# Patient Record
Sex: FEMALE | Race: ASIAN | Employment: FULL TIME | URBAN - METROPOLITAN AREA
[De-identification: names, ages, dates, MRNs, and addresses within clinical notes are randomized per-mention and may not be internally consistent; named-entity substitution may affect disease eponyms.]

---

## 2017-09-28 LAB
ABO + RH BLD: NORMAL
ABO + RH BLD: NORMAL
HBV SURFACE AG SERPL QL IA: NORMAL
HIV 1+2 AB+HIV1 P24 AG SERPL QL IA: NORMAL
RUBELLA ANTIBODY IGG QUANTITATIVE: NORMAL IU/ML
T PALLIDUM IGG SER QL: NORMAL

## 2018-04-02 LAB — GROUP B STREP PCR: NORMAL

## 2018-05-01 ENCOUNTER — ANESTHESIA (OUTPATIENT)
Dept: OBGYN | Facility: CLINIC | Age: 28
End: 2018-05-01
Payer: COMMERCIAL

## 2018-05-01 ENCOUNTER — ANESTHESIA EVENT (OUTPATIENT)
Dept: OBGYN | Facility: CLINIC | Age: 28
End: 2018-05-01
Payer: COMMERCIAL

## 2018-05-01 ENCOUNTER — HOSPITAL ENCOUNTER (INPATIENT)
Facility: CLINIC | Age: 28
LOS: 2 days | Discharge: HOME | End: 2018-05-03
Attending: OBSTETRICS & GYNECOLOGY | Admitting: OBSTETRICS & GYNECOLOGY
Payer: COMMERCIAL

## 2018-05-01 LAB
ABO + RH BLD: NORMAL
ABO + RH BLD: NORMAL
RUPTURE OF FETAL MEMBRANES BY ROM PLUS: POSITIVE
SPECIMEN EXP DATE BLD: NORMAL
T PALLIDUM IGG+IGM SER QL: NEGATIVE

## 2018-05-01 PROCEDURE — 00HU33Z INSERTION OF INFUSION DEVICE INTO SPINAL CANAL, PERCUTANEOUS APPROACH: ICD-10-PCS | Performed by: ANESTHESIOLOGY

## 2018-05-01 PROCEDURE — 72200001 ZZH LABOR CARE VAGINAL DELIVERY SINGLE

## 2018-05-01 PROCEDURE — 25000125 ZZHC RX 250: Performed by: ANESTHESIOLOGY

## 2018-05-01 PROCEDURE — 86780 TREPONEMA PALLIDUM: CPT | Performed by: OBSTETRICS & GYNECOLOGY

## 2018-05-01 PROCEDURE — 27110038 ZZH RX 271: Performed by: ANESTHESIOLOGY

## 2018-05-01 PROCEDURE — 86901 BLOOD TYPING SEROLOGIC RH(D): CPT | Performed by: OBSTETRICS & GYNECOLOGY

## 2018-05-01 PROCEDURE — G0463 HOSPITAL OUTPT CLINIC VISIT: HCPCS

## 2018-05-01 PROCEDURE — 25000128 H RX IP 250 OP 636: Performed by: OBSTETRICS & GYNECOLOGY

## 2018-05-01 PROCEDURE — 84112 EVAL AMNIOTIC FLUID PROTEIN: CPT | Performed by: OBSTETRICS & GYNECOLOGY

## 2018-05-01 PROCEDURE — 37000011 ZZH ANESTHESIA WARD SERVICE

## 2018-05-01 PROCEDURE — 3E0R3BZ INTRODUCTION OF ANESTHETIC AGENT INTO SPINAL CANAL, PERCUTANEOUS APPROACH: ICD-10-PCS | Performed by: ANESTHESIOLOGY

## 2018-05-01 PROCEDURE — 12000037 ZZH R&B POSTPARTUM INTERMEDIATE

## 2018-05-01 PROCEDURE — 59025 FETAL NON-STRESS TEST: CPT

## 2018-05-01 PROCEDURE — 25000128 H RX IP 250 OP 636: Performed by: ANESTHESIOLOGY

## 2018-05-01 PROCEDURE — 0KQM0ZZ REPAIR PERINEUM MUSCLE, OPEN APPROACH: ICD-10-PCS | Performed by: OBSTETRICS & GYNECOLOGY

## 2018-05-01 PROCEDURE — 25000125 ZZHC RX 250: Performed by: OBSTETRICS & GYNECOLOGY

## 2018-05-01 PROCEDURE — 86900 BLOOD TYPING SEROLOGIC ABO: CPT | Performed by: OBSTETRICS & GYNECOLOGY

## 2018-05-01 PROCEDURE — 25000132 ZZH RX MED GY IP 250 OP 250 PS 637: Performed by: OBSTETRICS & GYNECOLOGY

## 2018-05-01 PROCEDURE — 36415 COLL VENOUS BLD VENIPUNCTURE: CPT | Performed by: OBSTETRICS & GYNECOLOGY

## 2018-05-01 PROCEDURE — 10907ZC DRAINAGE OF AMNIOTIC FLUID, THERAPEUTIC FROM PRODUCTS OF CONCEPTION, VIA NATURAL OR ARTIFICIAL OPENING: ICD-10-PCS | Performed by: OBSTETRICS & GYNECOLOGY

## 2018-05-01 RX ORDER — NALBUPHINE HYDROCHLORIDE 10 MG/ML
2.5-5 INJECTION, SOLUTION INTRAMUSCULAR; INTRAVENOUS; SUBCUTANEOUS EVERY 6 HOURS PRN
Status: DISCONTINUED | OUTPATIENT
Start: 2018-05-01 | End: 2018-05-01

## 2018-05-01 RX ORDER — HYDROMORPHONE HYDROCHLORIDE 1 MG/ML
0.3 INJECTION, SOLUTION INTRAMUSCULAR; INTRAVENOUS; SUBCUTANEOUS
Status: DISCONTINUED | OUTPATIENT
Start: 2018-05-01 | End: 2018-05-03 | Stop reason: HOSPADM

## 2018-05-01 RX ORDER — OXYTOCIN/0.9 % SODIUM CHLORIDE 30/500 ML
340 PLASTIC BAG, INJECTION (ML) INTRAVENOUS CONTINUOUS PRN
Status: DISCONTINUED | OUTPATIENT
Start: 2018-05-01 | End: 2018-05-03 | Stop reason: HOSPADM

## 2018-05-01 RX ORDER — ONDANSETRON 2 MG/ML
4 INJECTION INTRAMUSCULAR; INTRAVENOUS EVERY 6 HOURS PRN
Status: DISCONTINUED | OUTPATIENT
Start: 2018-05-01 | End: 2018-05-01

## 2018-05-01 RX ORDER — NALOXONE HYDROCHLORIDE 0.4 MG/ML
.1-.4 INJECTION, SOLUTION INTRAMUSCULAR; INTRAVENOUS; SUBCUTANEOUS
Status: DISCONTINUED | OUTPATIENT
Start: 2018-05-01 | End: 2018-05-03 | Stop reason: HOSPADM

## 2018-05-01 RX ORDER — ONDANSETRON 4 MG/1
4 TABLET, ORALLY DISINTEGRATING ORAL EVERY 6 HOURS PRN
Status: DISCONTINUED | OUTPATIENT
Start: 2018-05-01 | End: 2018-05-01

## 2018-05-01 RX ORDER — METHYLERGONOVINE MALEATE 0.2 MG/ML
200 INJECTION INTRAVENOUS
Status: DISCONTINUED | OUTPATIENT
Start: 2018-05-01 | End: 2018-05-01

## 2018-05-01 RX ORDER — EPHEDRINE SULFATE 50 MG/ML
5 INJECTION, SOLUTION INTRAMUSCULAR; INTRAVENOUS; SUBCUTANEOUS
Status: DISCONTINUED | OUTPATIENT
Start: 2018-05-01 | End: 2018-05-01

## 2018-05-01 RX ORDER — MISOPROSTOL 200 UG/1
400 TABLET ORAL
Status: DISCONTINUED | OUTPATIENT
Start: 2018-05-01 | End: 2018-05-03 | Stop reason: HOSPADM

## 2018-05-01 RX ORDER — ROPIVACAINE HYDROCHLORIDE 2 MG/ML
10 INJECTION, SOLUTION EPIDURAL; INFILTRATION; PERINEURAL ONCE
Status: COMPLETED | OUTPATIENT
Start: 2018-05-01 | End: 2018-05-01

## 2018-05-01 RX ORDER — FENTANYL CITRATE 50 UG/ML
50-100 INJECTION, SOLUTION INTRAMUSCULAR; INTRAVENOUS
Status: DISCONTINUED | OUTPATIENT
Start: 2018-05-01 | End: 2018-05-01

## 2018-05-01 RX ORDER — NALOXONE HYDROCHLORIDE 0.4 MG/ML
.1-.4 INJECTION, SOLUTION INTRAMUSCULAR; INTRAVENOUS; SUBCUTANEOUS
Status: DISCONTINUED | OUTPATIENT
Start: 2018-05-01 | End: 2018-05-01

## 2018-05-01 RX ORDER — ACETAMINOPHEN 325 MG/1
650 TABLET ORAL EVERY 4 HOURS PRN
Status: DISCONTINUED | OUTPATIENT
Start: 2018-05-01 | End: 2018-05-01

## 2018-05-01 RX ORDER — OXYTOCIN/0.9 % SODIUM CHLORIDE 30/500 ML
100 PLASTIC BAG, INJECTION (ML) INTRAVENOUS CONTINUOUS
Status: DISCONTINUED | OUTPATIENT
Start: 2018-05-01 | End: 2018-05-03 | Stop reason: HOSPADM

## 2018-05-01 RX ORDER — LIDOCAINE HYDROCHLORIDE AND EPINEPHRINE 15; 5 MG/ML; UG/ML
INJECTION, SOLUTION EPIDURAL PRN
Status: DISCONTINUED | OUTPATIENT
Start: 2018-05-01 | End: 2018-05-02 | Stop reason: HOSPADM

## 2018-05-01 RX ORDER — SODIUM CHLORIDE, SODIUM LACTATE, POTASSIUM CHLORIDE, CALCIUM CHLORIDE 600; 310; 30; 20 MG/100ML; MG/100ML; MG/100ML; MG/100ML
INJECTION, SOLUTION INTRAVENOUS CONTINUOUS
Status: DISCONTINUED | OUTPATIENT
Start: 2018-05-01 | End: 2018-05-01

## 2018-05-01 RX ORDER — OXYCODONE HYDROCHLORIDE 5 MG/1
5 TABLET ORAL EVERY 4 HOURS PRN
Status: DISCONTINUED | OUTPATIENT
Start: 2018-05-01 | End: 2018-05-03 | Stop reason: HOSPADM

## 2018-05-01 RX ORDER — OXYTOCIN/0.9 % SODIUM CHLORIDE 30/500 ML
100-340 PLASTIC BAG, INJECTION (ML) INTRAVENOUS CONTINUOUS PRN
Status: DISCONTINUED | OUTPATIENT
Start: 2018-05-01 | End: 2018-05-01

## 2018-05-01 RX ORDER — CARBOPROST TROMETHAMINE 250 UG/ML
250 INJECTION, SOLUTION INTRAMUSCULAR
Status: DISCONTINUED | OUTPATIENT
Start: 2018-05-01 | End: 2018-05-01

## 2018-05-01 RX ORDER — ACETAMINOPHEN 325 MG/1
650 TABLET ORAL EVERY 4 HOURS PRN
Status: DISCONTINUED | OUTPATIENT
Start: 2018-05-01 | End: 2018-05-03 | Stop reason: HOSPADM

## 2018-05-01 RX ORDER — BUPIVACAINE HYDROCHLORIDE 2.5 MG/ML
INJECTION, SOLUTION EPIDURAL; INFILTRATION; INTRACAUDAL
Status: DISCONTINUED
Start: 2018-05-01 | End: 2018-05-01 | Stop reason: HOSPADM

## 2018-05-01 RX ORDER — FENTANYL CITRATE 50 UG/ML
100 INJECTION, SOLUTION INTRAMUSCULAR; INTRAVENOUS ONCE
Status: COMPLETED | OUTPATIENT
Start: 2018-05-01 | End: 2018-05-01

## 2018-05-01 RX ORDER — LIDOCAINE 40 MG/G
CREAM TOPICAL
Status: DISCONTINUED | OUTPATIENT
Start: 2018-05-01 | End: 2018-05-01

## 2018-05-01 RX ORDER — PRENATAL VIT/IRON FUM/FOLIC AC 27MG-0.8MG
1 TABLET ORAL DAILY
COMMUNITY

## 2018-05-01 RX ORDER — LANOLIN 100 %
OINTMENT (GRAM) TOPICAL
Status: DISCONTINUED | OUTPATIENT
Start: 2018-05-01 | End: 2018-05-03 | Stop reason: HOSPADM

## 2018-05-01 RX ORDER — OXYTOCIN 10 [USP'U]/ML
10 INJECTION, SOLUTION INTRAMUSCULAR; INTRAVENOUS
Status: DISCONTINUED | OUTPATIENT
Start: 2018-05-01 | End: 2018-05-01

## 2018-05-01 RX ORDER — AMOXICILLIN 250 MG
1 CAPSULE ORAL 2 TIMES DAILY
Status: DISCONTINUED | OUTPATIENT
Start: 2018-05-01 | End: 2018-05-03 | Stop reason: HOSPADM

## 2018-05-01 RX ORDER — OXYTOCIN/0.9 % SODIUM CHLORIDE 30/500 ML
1-24 PLASTIC BAG, INJECTION (ML) INTRAVENOUS CONTINUOUS
Status: DISCONTINUED | OUTPATIENT
Start: 2018-05-01 | End: 2018-05-01

## 2018-05-01 RX ORDER — OXYTOCIN 10 [USP'U]/ML
10 INJECTION, SOLUTION INTRAMUSCULAR; INTRAVENOUS
Status: DISCONTINUED | OUTPATIENT
Start: 2018-05-01 | End: 2018-05-03 | Stop reason: HOSPADM

## 2018-05-01 RX ORDER — HYDROCORTISONE 2.5 %
CREAM (GRAM) TOPICAL 3 TIMES DAILY PRN
Status: DISCONTINUED | OUTPATIENT
Start: 2018-05-01 | End: 2018-05-03 | Stop reason: HOSPADM

## 2018-05-01 RX ORDER — AMOXICILLIN 250 MG
2 CAPSULE ORAL 2 TIMES DAILY
Status: DISCONTINUED | OUTPATIENT
Start: 2018-05-01 | End: 2018-05-03 | Stop reason: HOSPADM

## 2018-05-01 RX ORDER — IBUPROFEN 600 MG/1
600 TABLET, FILM COATED ORAL EVERY 6 HOURS PRN
Status: DISCONTINUED | OUTPATIENT
Start: 2018-05-01 | End: 2018-05-03 | Stop reason: HOSPADM

## 2018-05-01 RX ORDER — BISACODYL 10 MG
10 SUPPOSITORY, RECTAL RECTAL DAILY PRN
Status: DISCONTINUED | OUTPATIENT
Start: 2018-05-03 | End: 2018-05-03 | Stop reason: HOSPADM

## 2018-05-01 RX ORDER — OXYCODONE AND ACETAMINOPHEN 5; 325 MG/1; MG/1
1 TABLET ORAL
Status: DISCONTINUED | OUTPATIENT
Start: 2018-05-01 | End: 2018-05-01

## 2018-05-01 RX ORDER — IBUPROFEN 400 MG/1
800 TABLET, FILM COATED ORAL
Status: DISCONTINUED | OUTPATIENT
Start: 2018-05-01 | End: 2018-05-01

## 2018-05-01 RX ADMIN — Medication 12 ML/HR: at 11:33

## 2018-05-01 RX ADMIN — ROPIVACAINE HYDROCHLORIDE 10 ML: 2 INJECTION, SOLUTION EPIDURAL; INFILTRATION at 12:01

## 2018-05-01 RX ADMIN — FENTANYL CITRATE 100 MCG: 50 INJECTION, SOLUTION INTRAMUSCULAR; INTRAVENOUS at 14:56

## 2018-05-01 RX ADMIN — Medication 2 MILLI-UNITS/MIN: at 12:15

## 2018-05-01 RX ADMIN — SODIUM CHLORIDE, POTASSIUM CHLORIDE, SODIUM LACTATE AND CALCIUM CHLORIDE 1000 ML: 600; 310; 30; 20 INJECTION, SOLUTION INTRAVENOUS at 10:25

## 2018-05-01 RX ADMIN — LIDOCAINE HYDROCHLORIDE AND EPINEPHRINE 3 ML: 15; 5 INJECTION, SOLUTION EPIDURAL at 11:34

## 2018-05-01 RX ADMIN — Medication 340 ML/HR: at 18:45

## 2018-05-01 RX ADMIN — LIDOCAINE HYDROCHLORIDE AND EPINEPHRINE 5 ML: 15; 5 INJECTION, SOLUTION EPIDURAL at 15:52

## 2018-05-01 RX ADMIN — ACETAMINOPHEN 650 MG: 325 TABLET ORAL at 20:27

## 2018-05-01 RX ADMIN — IBUPROFEN 600 MG: 600 TABLET ORAL at 20:27

## 2018-05-01 RX ADMIN — SODIUM CHLORIDE, POTASSIUM CHLORIDE, SODIUM LACTATE AND CALCIUM CHLORIDE: 600; 310; 30; 20 INJECTION, SOLUTION INTRAVENOUS at 11:36

## 2018-05-01 RX ADMIN — Medication 8 ML: at 14:51

## 2018-05-01 RX ADMIN — Medication 100 ML/HR: at 19:11

## 2018-05-01 NOTE — H&P
The patient is a 28 yo  with POPYP 18 who is admitted in labor SROM at 0400 hrs today.    Her prenatal course was followed at [facility] since 18 at 10 1/7 wks.     Prenatal labs show blood type A positive, Cynthia screen neg, Rubella immune, Hep B , Hep C, VDRL and HIV all negative.  GC/chlamydia negative.TSH 1.65, vit D 24.2, plts 290 K.      Ultrasound 10/9/17 11 6/7 wk Normal NT.    INNATAL negative, female infant  Flu shot 17    Ultrasound 17 19 5/7 wk, AGA, anterior placenta, nl ERIC, cx 5.5 cm, female    Glucose screen 18 184, 3 hr GTT normal on 18.    GB S negative on 18    Last prenatal visit on 18 at 40 2/7 wk,, cx 3/30/-2.    She had contractions, came to the MAC around 3 AM, had some leaking of fluid, positive amnisure around 0400.  Not leaking much since.    Contractions getting stronger.    Category 1 tracing    Exam: Cx 3-/vtx -2 Bulging forebag.  Forebag ruptured large amount of clear fluid, more than typical, probable some degree of polyhydramnios.  EFW 7.5 to 8 #    IUP 40 3/7 wk  SROM  Early labor  GBS negative  Now loss of large amount of clear fluid    Plan  Wants epidural eventually  Discussed possible pitocin of her labor does not improve with rupture of forebag.

## 2018-05-01 NOTE — PLAN OF CARE
1521- Dr. Herndon, [facility] @bedside to evaluate epidural. Epidural catheter pulled back and then rebolused @0469.

## 2018-05-01 NOTE — PLAN OF CARE
Data: Patient presented to [facility] at 0315.   Reason for maternal/fetal assessment per patient is Rule Out Labor  .  Patient is a . Prenatal record reviewed.      Obstetric History       T0      L0     SAB0   TAB0   Ectopic0   Multiple0   Live Births0       # Outcome Date GA Lbr Andrea/2nd Weight Sex Delivery Anes PTL Lv   1 Current               . Medical history: History reviewed. No pertinent past medical history.. Gestational Age 40w3d. VSS. Fetal movement present. Patient denies  backache, vaginal discharge, pelvic pressure, UTI symptoms, GI problems, bloody show, vaginal bleeding, edema, headache, visual disturbances, epigastric or URQ pain, rupture of membranes. Support persons present. Denies problem with pregnancy,  Notice fluid on chux before SVE. ROM test sent. Results positive.  Action: Verbal consent for EFM. Triage assessment completed. EFM applied for fetal assessment in labor . Uterine assessment done. Fetal assessment: Presumed adequate fetal oxygenation documented (see flow record).   Response: Dr. BREANN Webb informed of arrival, contractions every 3-5 minutes, reactive FHR tracing , cervix 3-4 cms, 80%, -2 station, Positive ROM test. But still feel bulging bag.. Plan per provider is admit for labor and see if labor progresses. encourage patient to ambulate. Patient verbalized agreement with plan. Patient transferred to room 214 ambulatory, oriented to room and call light. Report given to Yue Nayak RN.    I

## 2018-05-01 NOTE — PLAN OF CARE
1451- Dr. Mazariegos, HELENE @bedside to rebolus epidural. Bupivicaine and fentanyl pulled from bContexts by ANDREINA Salas RN and handed off to HELENE to administer, document, and dispose of/waste. Narcotic Time-Out Form completed.

## 2018-05-01 NOTE — ANESTHESIA PREPROCEDURE EVALUATION
Anesthesia Evaluation     .             ROS/MED HX    ENT/Pulmonary:  - neg pulmonary ROS     Neurologic:       Cardiovascular:  - neg cardiovascular ROS       METS/Exercise Tolerance:     Hematologic:         Musculoskeletal:         GI/Hepatic:         Renal/Genitourinary:         Endo:         Psychiatric:         Infectious Disease:         Malignancy:         Other:                     Physical Exam      Airway   Mallampati: II  TM distance: > 3 FB  Neck ROM: full  Mouth opening: > 3 cm    Dental     Cardiovascular       Pulmonary           neg OB ROS                 Anesthesia Plan      History & Physical Review      ASA Status:  .  OB Epidural Asa: 2            Postoperative Care      Consents  Anesthetic plan, risks, benefits and alternatives discussed with:  Patient..                          .

## 2018-05-01 NOTE — PLAN OF CARE
1114- Dr. Mazariegos, HELENE @bedside to place epidural. Ropivicaine pulled from Proximexs by ANDREINA Salas RN and handed off to HELENE to administer, document, and dispose of/waste.

## 2018-05-01 NOTE — PLAN OF CARE
Patient admitted from triage for labor.  Oriented to room and unit.  Care plan reviewed.  Questions answered.

## 2018-05-01 NOTE — IP AVS SNAPSHOT
MRN:[id number]                      After Visit Summary   5/1/2018    Kimberlyn Carlson    MRN: [id number]           Thank you!     Thank you for choosing [facility] for your care. Our goal is always to provide you with excellent care. Hearing back from our patients is one way we can continue to improve our services. Please take a few minutes to complete the written survey that you may receive in the mail after you visit with us. Thank you!        Patient Information     Date Of Birth          1990        About your hospital stay     You were admitted on:  May 1, 2018 You last received care in the:  [address]    You were discharged on:  May 3, 2018        Reason for your hospital stay       Maternity care                  Who to Call     For medical emergencies, please call 911.  For non-urgent questions about your medical care, please call your primary care provider or clinic, [phone]          Attending Provider     Provider Specialty    Haleigh Webb MD OB/Gyn       Primary Care Provider Office Phone # Fax #    Haleigh Webb [phone]      After Care Instructions     Activity       Review discharge instructions            Diet       Resume previous diet            Discharge Instructions - Postpartum visit       Schedule postpartum visit with your provider and return to clinic in 6 weeks.                  Additional Services     LACTATION REFERRAL       Your provider has referred you to: [facility] [phone]   [email or web address]    Please be aware that coverage of these services is subject to the terms and limitations of your health insurance plan.  Call member services at your health plan with any benefit or coverage questions.      Please bring the following with you to your appointment:    (1) Any X-Rays, CTs or MRIs which have been performed.   Contact the facility where they were done to arrange for  prior to your scheduled appointment.    (2) List of current medications  (3) This referral request   (4) Any documents/labs given to you for this referral                  Further instructions from your care team       Postpartum Vaginal Delivery Instructions    Activity       Ask family and friends for help when you need it.    Do not place anything in your vagina for 6 weeks.    You are not restricted on other activities, but take it easy for a few weeks to allow your body to recover from delivery.  You are able to do any activities you feel up to that point.    No driving until you have stopped taking your pain medications (usually two weeks after delivery).     Call your health care provider if you have any of these symptoms:       Increased pain, swelling, redness, or fluid around your stiches from an episiotomy or perineal tear.    A fever above 100.4 F (38 C) with or without chills when placing a thermometer under your tongue.    You soak a sanitary pad with blood within 1 hour, or you see blood clots larger than a golf ball.    Bleeding that lasts more than 6 weeks.    Vaginal discharge that smells bad.    Severe pain, cramping or tenderness in your lower belly area.    A need to urinate more frequently (use the toilet more often), more urgently (use the toilet very quickly), or it burns when you urinate.    Nausea and vomiting.    Redness, swelling or pain around a vein in your leg.    Problems breastfeeding or a red or painful area on your breast.    Chest pain and cough or are gasping for air.    Problems coping with sadness, anxiety, or depression.  If you have any concerns about hurting yourself or the baby, call your provider immediately.     You have questions or concerns after you return home.     Keep your hands clean:  Always wash your hands before touching your perineal area and stitches.  This helps reduce your risk of infection.   "If your hands aren't dirty, you may use an alcohol hand-rub to clean your hands. Keep your nails clean and short.        Pending Results     No orders found from 2018 to 2018.            Statement of Approval     Ordered          18 0829  I have reviewed and agree with all the recommendations and orders detailed in this document.  EFFECTIVE NOW     Approved and electronically signed by:  Duarte Peterson MD             Admission Information     Date & Time Provider Department Dept. Phone    2018 Haleigh Webb MD [address] [phone]      Your Vitals Were     Blood Pressure Pulse Temperature Respirations Height Weight    107/64 62 98.3  F (36.8  C) (Oral) 16 1.626 m (5' 4\") 77.6 kg (171 lb)    Pulse Oximetry BMI (Body Mass Index)                100% 29.35 kg/m2          MyChart Information     [employer] lets you send messages to your doctor, view your test results, renew your prescriptions, schedule appointments and more. To sign up, go to [email or web address] . Click on \"Log in\" on the left side of the screen, which will take you to the Welcome page. Then click on \"Sign up Now\" on the right side of the page.     You will be asked to enter the access code listed below, as well as some personal information. Please follow the directions to create your username and password.     Your access code is: [email or web address]  Expires: 2018 11:51 AM     Your access code will  in 90 days. If you need help or a new code, please call your [place] clinic or [phone].        Care EveryWhere ID     This is your Care EveryWhere ID. This could be used by other organizations to access your [place] medical records  [id number]        Equal Access to Services     [facility] ANGELICA : Javier Brown, dinorah ellsworth, qalucia cabrera. So [facility] [phone].    ATENCIÓN: Si habla español, tiene a kiser disposición " servicios gratuitos de asistencia lingüística. Nicolás mad[place] [phone].    We comply with applicable federal civil rights laws and [place] laws. We do not discriminate on the basis of race, color, national origin, age, disability, sex, sexual orientation, or gender identity.               Review of your medicines      CONTINUE these medicines which have NOT CHANGED        Dose / Directions    prenatal multivitamin plus iron 27-0.8 MG Tabs per tablet        Dose:  1 tablet   Take 1 tablet by mouth daily   Refills:  0         STOP taking     ranitidine 150 MG tablet   Commonly known as:  ZANTAC                    Protect others around you: Learn how to safely use, store and throw away your medicines at www.disposemymeds.org.             Medication List: This is a list of all your medications and when to take them. Check marks below indicate your daily home schedule. Keep this list as a reference.      Medications           Morning Afternoon Evening Bedtime As Needed    prenatal multivitamin plus iron 27-0.8 MG Tabs per tablet   Take 1 tablet by mouth daily

## 2018-05-01 NOTE — PLAN OF CARE
Patient presented to triage stating she had regular contractions at 0100 with them becoming stronger by 0200, was seen I office on Monday  Exam then was 3/50%/-2. monitors applied with consent , questions asked .No c/o bleeding or her BOW breaking.

## 2018-05-01 NOTE — IP AVS SNAPSHOT
[address]    [place] [address]    Phone:  [phone]                                       After Visit Summary   5/1/2018    Kimberlyn Carlson    MRN: [id number]           After Visit Summary Signature Page     I have received my discharge instructions, and my questions have been answered. I have discussed any challenges I see with this plan with the nurse or doctor.    ..........................................................................................................................................  Patient/Patient Representative Signature      ..........................................................................................................................................  Patient Representative Print Name and Relationship to Patient    ..................................................               ................................................  Date                                            Time    ..........................................................................................................................................  Reviewed by Signature/Title    ...................................................              ..............................................  Date                                                            Time

## 2018-05-02 PROCEDURE — 12000037 ZZH R&B POSTPARTUM INTERMEDIATE

## 2018-05-02 PROCEDURE — 25000132 ZZH RX MED GY IP 250 OP 250 PS 637: Performed by: OBSTETRICS & GYNECOLOGY

## 2018-05-02 RX ADMIN — ACETAMINOPHEN 650 MG: 325 TABLET ORAL at 20:12

## 2018-05-02 RX ADMIN — SENNOSIDES AND DOCUSATE SODIUM 2 TABLET: 8.6; 5 TABLET ORAL at 07:59

## 2018-05-02 RX ADMIN — ACETAMINOPHEN 650 MG: 325 TABLET ORAL at 07:59

## 2018-05-02 RX ADMIN — IBUPROFEN 600 MG: 600 TABLET ORAL at 02:28

## 2018-05-02 RX ADMIN — SENNOSIDES AND DOCUSATE SODIUM 1 TABLET: 8.6; 5 TABLET ORAL at 20:09

## 2018-05-02 RX ADMIN — IBUPROFEN 600 MG: 600 TABLET ORAL at 08:00

## 2018-05-02 RX ADMIN — ACETAMINOPHEN 650 MG: 325 TABLET ORAL at 15:22

## 2018-05-02 RX ADMIN — ACETAMINOPHEN 650 MG: 325 TABLET ORAL at 02:28

## 2018-05-02 NOTE — PROGRESS NOTES
"PPD #1    Doing well. Pain controlled. Breastfeeding without difficulty.    /69  Pulse 67  Temp 97.4  F (36.3  C) (Oral)  Resp 16  Ht 1.626 m (5' 4\")  Wt 77.6 kg (171 lb)  SpO2 100%  Breastfeeding? Unknown  BMI 29.35 kg/m2  Gen NAD  Abd soft nontender. Fundus firm.  Ext no c/c/e    A/P: PPD #1 after uncomplicated   Routine postpartum care  Anticipate discharge home tomorrow.    Jennifer L. Schwab MD  "

## 2018-05-02 NOTE — PLAN OF CARE
Pt. admitted from L&D  via wheelchair and transferred to bed with Assist x1. Pt. arrived with baby and was accompanied by significant other Kaushik and arrived with personal belongings. Report was taken from Machelle HASSAN RN in L&D. VSS. Fundus is firm and midline.  Vaginal bleeding is scant.  SL in left hand.  Pt. oriented to the room and call light system.

## 2018-05-02 NOTE — PLAN OF CARE
Data: Kimberlyn Carlson transferred to 429 via wheelchair at 2215. Baby transferred via parent's arms.  Action: Receiving unit notified of transfer: Yes. Patient and family notified of room change. Report given to WESLEY Mac at 2220. Belongings sent to receiving unit. Accompanied by Registered Nurse. Oriented patient to surroundings. Call light within reach. ID bands double-checked with receiving RN.  Response: Patient tolerated transfer and is stable.

## 2018-05-02 NOTE — PLAN OF CARE
Problem: Patient Care Overview  Goal: Plan of Care/Patient Progress Review  Outcome: Improving  Vital signs stable. Voiding without difficulty. Pain managed with tylenol and ibuprofen. Ambulated to bathroom during shift- with stand-by. Working on breast feeding every 2-3 hours. Will continue to monitor.

## 2018-05-02 NOTE — LACTATION NOTE
Initial Lactation visit.  Recommend unlimited, frequent breast feedings: At least 8 - 12 times every 24 hours. Avoid pacifiers and supplementation with formula unless medically indicated. Explained benefits of holding baby skin on skin to help promote better breastfeeding outcomes.   Infant fed well this morning.  Has been sleepy off and on for feedings.  Kimberlyn attempting to rest.  Reviewed normal second day feeding.    Will revisit as needed.    May Kenyon RN, IBCLC

## 2018-05-02 NOTE — PROCEDURES
DELIVERY NOTE    The patient is a 26 yo  with POPPY 18 who was admitted in labor after SROM at 0400 earlier today on 2018. She had contractions, came to the MAC around 3 AM, had some leaking of fluid, positive amnisure around 0400.     Her prenatal course was followed at [facility] since 18 at 10 1/7 weeks.    Prenatal labs show blood type A positive, Antibody screen neg, Rubella immune, Hep B , Hep C, VDRL and HIV all negative.  GC/chlamydia negative.TSH 1.65, vit D 24.2, plts 290 K.    Ultrasound on10/9/17 at 11 6/7 weeks demonstrated a normal NT.  INNATAL negative (female infant)  Flu shot 17  Ultrasound on 17 19 5/7 weeks, AGA, anterior placenta, normal ERIC, cx 5.5 cm, female  Glucose screen 18 184, 3 hr GTT normal on 18.  GBS negative on 18  Last prenatal visit on 18 at 40 2/7 wk,, cx 3/30/-2.   Shortly after admission, her Cervix was 3-4/40/vtx -2 with a bulging forebag.  Forebag ruptured large amount of clear fluid, more than typical, probable some degree of polyhydramnios.  EFW 7.5 to 8 #    Her labor progressed well. She received an epidural for labor analgesia. She proceeded to complete around 1700 and labored down. She started pushing shortly after 1800. Fetal heart tones throughout labor and delivery were reassuring (either a Category 1 or 2 tracing with no worrisome decelerations).    Single viable female infant delivered via cephalic presentation (SENG) at 1841 on 2018.  APGARS 9 and 9  Weight 3330 grams or 7 lbs 5.5 oz.    Placenta delivered complete/spontaneous/intact at 1845 on 2018.  3 vessel cord.    Second degree vaginal/perineal laceration inspected and repaired with 3-0 vicryl.   cc.  No complications.  Sponge, lap and needles counted with the nurse and found to be correct.  Mother and  baby girl stable in the room.      Duarte Peterson MD

## 2018-05-02 NOTE — PLAN OF CARE
Problem: Patient Care Overview  Goal: Plan of Care/Patient Progress Review  Outcome: Improving  Pt VSS, voiding in good amounts.  FF and flow minimal, no clots.    Working on breast feeding, infant is nursing well. Pain well controlled with prescribed pain medications. Independent with cares of self and infant in the room.  Significant other at bedside and supportive.  No concerns at this time, will continue to monitor.

## 2018-05-02 NOTE — ANESTHESIA POSTPROCEDURE EVALUATION
Patient: Kimberlyn Carlson    * No procedures listed *    Diagnosis:* No pre-op diagnosis entered *  Diagnosis Additional Information: No value filed.    Anesthesia Type:  No value filed.    Note:  Anesthesia Post Evaluation    Patient location during evaluation: Bedside  Patient participation: Able to fully participate in evaluation  Level of consciousness: awake and alert  Pain management: adequate  Airway patency: patent  Cardiovascular status: acceptable and hemodynamically stable  Respiratory status: acceptable and unassisted  Hydration status: acceptable  PONV: none     Anesthetic complications: None    Comments: Patient doing well, ambulating without difficulty, denies any HA, paresthesias or complications associated with the epidural.          Last vitals:  Vitals:    05/02/18 0228 05/02/18 0800 05/02/18 1515   BP: 108/70 110/69 130/80   Pulse:      Resp: 16 16 16   Temp: 37  C (98.6  F) 36.3  C (97.4  F) 36.4  C (97.6  F)   SpO2:            Electronically Signed By: Immanuel Guerra MD  May 2, 2018  4:39 PM

## 2018-05-03 VITALS
HEIGHT: 64 IN | TEMPERATURE: 98.3 F | RESPIRATION RATE: 16 BRPM | SYSTOLIC BLOOD PRESSURE: 107 MMHG | DIASTOLIC BLOOD PRESSURE: 64 MMHG | OXYGEN SATURATION: 100 % | HEART RATE: 62 BPM | BODY MASS INDEX: 29.19 KG/M2 | WEIGHT: 171 LBS

## 2018-05-03 PROCEDURE — 25000132 ZZH RX MED GY IP 250 OP 250 PS 637: Performed by: OBSTETRICS & GYNECOLOGY

## 2018-05-03 RX ADMIN — ACETAMINOPHEN 650 MG: 325 TABLET ORAL at 00:38

## 2018-05-03 RX ADMIN — ACETAMINOPHEN 650 MG: 325 TABLET ORAL at 05:17

## 2018-05-03 NOTE — LACTATION NOTE
Follow up visit.  Infant feeding well and cluster fed overnight.  Reviewed normal process of milk coming in and signs infant is getting enough.  Discussed outpatient lactation resources for follow up upon discharge.  Kimberlyn had no questions today and felt good about going home today.   May Kenyon  RN, IBCLC

## 2018-05-03 NOTE — PROGRESS NOTES
" OB PPD #2    Doing well. Pain controlled. Breastfeeding without difficulty.    /70  Pulse 67  Temp 98.1  F (36.7  C) (Oral)  Resp 16  Ht 1.626 m (5' 4\")  Wt 77.6 kg (171 lb)  SpO2 100%  Breastfeeding? Unknown  BMI 29.35 kg/m2  General: NAD  Abdomen: soft nontender. Fundus firm.  EXT: no C/C/E; non-tender    A/P: PPD #2 after uncomplicated   Routine postpartum care.  Discharge home today.  Follow-up in 6 weeks for postpartum visit.    Duarte Peterson MD  "

## 2018-05-03 NOTE — DISCHARGE SUMMARY
[facility] Discharge Summary    Kimberlyn Carlson MRN# [id number]   Age: 27 year old YOB: 1990     Date of Admission:  5/1/2018  Date of Discharge::  5/3/2018  Admitting Physician:  Haleigh Webb MD  Discharge Physician:  Duarte Peterson MD     Home clinic: [facility]          Admission Diagnoses:   Intrauterine pregnancy at 40 weeks gestation  Active labor          Discharge Diagnosis:   Normal spontaneous vaginal delivery          Procedures:   Procedure(s): Repair of second degree perineal laceration       No other procedures performed during this admission           Medications Prior to Admission:     Prescriptions Prior to Admission   Medication Sig Dispense Refill Last Dose     Prenatal Vit-Fe Fumarate-FA (PRENATAL MULTIVITAMIN PLUS IRON) 27-0.8 MG TABS per tablet Take 1 tablet by mouth daily   4/30/2018 at Unknown time     [DISCONTINUED] ranitidine (ZANTAC) 150 MG tablet Take 150 mg by mouth 2 times daily   4/30/2018 at Unknown time             Discharge Medications:     Current Discharge Medication List      CONTINUE these medications which have NOT CHANGED    Details   Prenatal Vit-Fe Fumarate-FA (PRENATAL MULTIVITAMIN PLUS IRON) 27-0.8 MG TABS per tablet Take 1 tablet by mouth daily         STOP taking these medications       ranitidine (ZANTAC) 150 MG tablet Comments:   Reason for Stopping:                     Consultations:   No consultations were requested during this admission          Brief History of Labor:   Admitted at 40 weeks gestation in active labor. Uncomplicated labor and delivery. See delivery note for details.           Hospital Course:   The patient's hospital course was unremarkable.  On discharge, her pain was well controlled. Vaginal bleeding is similar to peak menstrual flow.  Voiding without difficulty.  Ambulating well and tolerating a normal diet.  No fever.  Breastfeeding well.  Infant is stable.  No bowel movement yet.*  She was discharged on  post-partum day #2.    Post-partum hemoglobin: No results found for: HGB          Discharge Instructions and Follow-Up:   Discharge diet: Regular   Discharge activity: Pelvic rest: abstain from intercourse and do not use tampons for 6 week(s)   Discharge follow-up: Follow up with Dr. Webb in 6 weeks   Wound care: Drink plenty of fluids  Ice to area for comfort           Discharge Disposition:   Discharged to home      Attestation:  Total time: 30 minutes    Duarte Peterson MD

## 2018-05-06 ENCOUNTER — LACTATION ENCOUNTER (OUTPATIENT)
Age: 28
End: 2018-05-06

## 2018-05-06 NOTE — LACTATION NOTE
This note was copied from a baby's chart.  Routine visit. Baby was re-admitted for elevated bilirubin.  Baby breastfeeding well per mother and RN.  Mother states her milk is in and she hears gulping when baby at breast. Discussed breastpumps.  Questions answered regarding pumping and physiology of milk supply and production.  Mother inquired about hospital grade pump, LC gave her the rental information if she would need one in the future, not recommend at this time.   No further questions at this time. Will follow as needed. Alicia Davis BSN, RN, PHN, RNC-MNN, IBCLC

## 2020-12-04 NOTE — PLAN OF CARE
Problem: Patient Care Overview  Goal: Plan of Care/Patient Progress Review  Outcome: Improving  Vitals within defined limits. Fundus firm. Lochia scant. Voiding without issues. Up ad meeta. Using PRN Tylenol for uterine cramping pain as well as soreness to perineum with good relief. Declined heat packs at this time. Patient has declined PRN Motrin as she reports that it makes her stomach upset. Breastfeeding infant every 2-3 hours with good latch. Significant other at bedside and supportive. Will continue to monitor.        Hydroxyzine Counseling: Patient advised that the medication is sedating and not to drive a car after taking this medication.  Patient informed of potential adverse effects including but not limited to dry mouth, urinary retention, and blurry vision.  The patient verbalized understanding of the proper use and possible adverse effects of hydroxyzine.  All of the patient's questions and concerns were addressed.

## 2021-03-01 LAB — GROUP B STREP PCR: NORMAL

## 2021-03-23 ENCOUNTER — HOSPITAL ENCOUNTER (OUTPATIENT)
Facility: CLINIC | Age: 31
Discharge: HOME | End: 2021-03-23
Attending: OBSTETRICS & GYNECOLOGY | Admitting: OBSTETRICS & GYNECOLOGY
Payer: COMMERCIAL

## 2021-03-23 ENCOUNTER — APPOINTMENT (OUTPATIENT)
Dept: ULTRASOUND IMAGING | Facility: CLINIC | Age: 31
End: 2021-03-23
Attending: MIDWIFE
Payer: COMMERCIAL

## 2021-03-23 VITALS
WEIGHT: 170 LBS | HEIGHT: 65 IN | TEMPERATURE: 98 F | SYSTOLIC BLOOD PRESSURE: 109 MMHG | BODY MASS INDEX: 28.32 KG/M2 | DIASTOLIC BLOOD PRESSURE: 66 MMHG | RESPIRATION RATE: 16 BRPM

## 2021-03-23 PROBLEM — O26.90 PREGNANCY RELATED CONDITION: Status: ACTIVE | Noted: 2021-03-23

## 2021-03-23 PROCEDURE — G0463 HOSPITAL OUTPT CLINIC VISIT: HCPCS | Mod: 25

## 2021-03-23 PROCEDURE — 76819 FETAL BIOPHYS PROFIL W/O NST: CPT

## 2021-03-23 PROCEDURE — 59025 FETAL NON-STRESS TEST: CPT

## 2021-03-23 RX ORDER — FAMOTIDINE 20 MG/1
20 TABLET, FILM COATED ORAL 2 TIMES DAILY PRN
COMMUNITY

## 2021-03-23 RX ORDER — ONDANSETRON 2 MG/ML
4 INJECTION INTRAMUSCULAR; INTRAVENOUS EVERY 6 HOURS PRN
Status: DISCONTINUED | OUTPATIENT
Start: 2021-03-23 | End: 2021-03-23 | Stop reason: HOSPADM

## 2021-03-23 ASSESSMENT — MIFFLIN-ST. JEOR: SCORE: 1496.99

## 2021-03-23 NOTE — DISCHARGE INSTRUCTIONS
Discharge Instruction for Undelivered Patients      You were seen for: Fetal Assessment  We Consulted: Dr. Astorga  You had (Test or Medicine):NST (non stress test), BPP (Biophysical profile)     Diet:   Drink 8 to 12 glasses of liquids (milk, juice, water) every day.  You may eat meals and snacks.     Activity:  Count fetal kicks everyday (see handout)  Call your doctor or nurse midwife if your baby is moving less than usual.     Call your provider if you notice:  Swelling in your face or increased swelling in your hands or legs.  Headaches that are not relieved by Tylenol (acetaminophen).  Changes in your vision (blurring: seeing spots or stars.)  Nausea (sick to your stomach) and vomiting (throwing up).   Weight gain of 5 pounds or more per week.  Heartburn that doesn't go away.  Signs of bladder infection: pain when you urinate (use the toilet), need to go more often and more urgently.  The bag of small (rupture of membranes) breaks, or you notice leaking in your underwear.  Bright red blood in your underwear.  Abdominal (lower belly) or stomach pain.  For first baby: Contractions (tightening) less than 5 minutes apart for one hour or more.  Second (plus) baby: Contractions (tightening) less than 10 minutes apart and getting stronger.  *If less than 34 weeks: Contractions (tightenings) more than 6 times in one hour.  Increase or change in vaginal discharge (note the color and amount)  Other:     Follow-up:  As scheduled in the clinic

## 2021-03-23 NOTE — PLAN OF CARE
39+1 week patient of [facility] ambulates to [facility] from office for NST and BPP following her routine visit.  Kaylie Man CNM calls prior to arrival stating that at the visit she noted some fetal decelerations and wanted further monitoring and BPP.  POC reviewed with pt.  Pt denies other problems with pregnancy.  States baby less active than normal but still active.  US monitor applied.  US ready for pt to conduct BPP.  Pt to U/S in W/C accomp. By NA.

## 2021-03-23 NOTE — PROVIDER NOTIFICATION
03/23/21 1157   Provider Notification   Provider Name/Title Gauri   Method of Notification Electronic Page   immediate return of page.  Updated on arrival, reason sent to MAC from clinic, BPP 8/8, Occasional UC that pt not feeling, FHT's 130's with moderate variability, accels to 160's.  No decels. Questionable wandering baseline. Orders to discharge home and F/U in clinic at regular visit on 3/29.  discharge instructions given and reviewed with pt and spouse.  Pt able to repeat back when to call or F/U.  Support given.  ambulates to car.

## 2021-03-26 ENCOUNTER — ANESTHESIA EVENT (OUTPATIENT)
Dept: OBGYN | Facility: CLINIC | Age: 31
End: 2021-03-26
Payer: COMMERCIAL

## 2021-03-26 ENCOUNTER — HOSPITAL ENCOUNTER (INPATIENT)
Facility: CLINIC | Age: 31
LOS: 1 days | Discharge: HOME | End: 2021-03-27
Attending: OBSTETRICS & GYNECOLOGY | Admitting: OBSTETRICS & GYNECOLOGY
Payer: COMMERCIAL

## 2021-03-26 ENCOUNTER — ANESTHESIA (OUTPATIENT)
Dept: OBGYN | Facility: CLINIC | Age: 31
End: 2021-03-26
Payer: COMMERCIAL

## 2021-03-26 PROBLEM — Z36.89 ENCOUNTER FOR TRIAGE IN PREGNANT PATIENT: Status: ACTIVE | Noted: 2021-03-26

## 2021-03-26 LAB
ABO + RH BLD: NORMAL
ABO + RH BLD: NORMAL
BLD GP AB SCN SERPL QL: NORMAL
BLOOD BANK CMNT PATIENT-IMP: NORMAL
CREAT SERPL-MCNC: 0.68 MG/DL (ref 0.52–1.04)
GFR SERPL CREATININE-BSD FRML MDRD: >90 ML/MIN/{1.73_M2}
HGB BLD-MCNC: 11.3 G/DL (ref 11.7–15.7)
LABORATORY COMMENT REPORT: ABNORMAL
PLATELET # BLD AUTO: 264 10E9/L (ref 150–450)
SARS-COV-2 RNA RESP QL NAA+PROBE: POSITIVE
SPECIMEN EXP DATE BLD: NORMAL
SPECIMEN SOURCE: ABNORMAL
T PALLIDUM AB SER QL: NONREACTIVE

## 2021-03-26 PROCEDURE — 120N000012 HC R&B POSTPARTUM

## 2021-03-26 PROCEDURE — 3E0R3BZ INTRODUCTION OF ANESTHETIC AGENT INTO SPINAL CANAL, PERCUTANEOUS APPROACH: ICD-10-PCS | Performed by: ANESTHESIOLOGY

## 2021-03-26 PROCEDURE — 00HU33Z INSERTION OF INFUSION DEVICE INTO SPINAL CANAL, PERCUTANEOUS APPROACH: ICD-10-PCS | Performed by: ANESTHESIOLOGY

## 2021-03-26 PROCEDURE — 86900 BLOOD TYPING SEROLOGIC ABO: CPT | Performed by: OBSTETRICS & GYNECOLOGY

## 2021-03-26 PROCEDURE — 0UQGXZZ REPAIR VAGINA, EXTERNAL APPROACH: ICD-10-PCS | Performed by: OBSTETRICS & GYNECOLOGY

## 2021-03-26 PROCEDURE — 250N000011 HC RX IP 250 OP 636: Performed by: ANESTHESIOLOGY

## 2021-03-26 PROCEDURE — 370N000003 HC ANESTHESIA WARD SERVICE

## 2021-03-26 PROCEDURE — 82565 ASSAY OF CREATININE: CPT | Performed by: OBSTETRICS & GYNECOLOGY

## 2021-03-26 PROCEDURE — G0463 HOSPITAL OUTPT CLINIC VISIT: HCPCS | Mod: 25

## 2021-03-26 PROCEDURE — 250N000009 HC RX 250: Performed by: ANESTHESIOLOGY

## 2021-03-26 PROCEDURE — 86850 RBC ANTIBODY SCREEN: CPT | Performed by: OBSTETRICS & GYNECOLOGY

## 2021-03-26 PROCEDURE — 10907ZC DRAINAGE OF AMNIOTIC FLUID, THERAPEUTIC FROM PRODUCTS OF CONCEPTION, VIA NATURAL OR ARTIFICIAL OPENING: ICD-10-PCS | Performed by: OBSTETRICS & GYNECOLOGY

## 2021-03-26 PROCEDURE — 87635 SARS-COV-2 COVID-19 AMP PRB: CPT | Performed by: OBSTETRICS & GYNECOLOGY

## 2021-03-26 PROCEDURE — 59025 FETAL NON-STRESS TEST: CPT

## 2021-03-26 PROCEDURE — C9803 HOPD COVID-19 SPEC COLLECT: HCPCS

## 2021-03-26 PROCEDURE — 86780 TREPONEMA PALLIDUM: CPT | Performed by: OBSTETRICS & GYNECOLOGY

## 2021-03-26 PROCEDURE — 85049 AUTOMATED PLATELET COUNT: CPT | Performed by: OBSTETRICS & GYNECOLOGY

## 2021-03-26 PROCEDURE — 36415 COLL VENOUS BLD VENIPUNCTURE: CPT | Performed by: OBSTETRICS & GYNECOLOGY

## 2021-03-26 PROCEDURE — 258N000003 HC RX IP 258 OP 636: Performed by: OBSTETRICS & GYNECOLOGY

## 2021-03-26 PROCEDURE — 250N000013 HC RX MED GY IP 250 OP 250 PS 637: Performed by: OBSTETRICS & GYNECOLOGY

## 2021-03-26 PROCEDURE — 722N000001 HC LABOR CARE VAGINAL DELIVERY SINGLE

## 2021-03-26 PROCEDURE — 85018 HEMOGLOBIN: CPT | Performed by: OBSTETRICS & GYNECOLOGY

## 2021-03-26 PROCEDURE — 250N000009 HC RX 250: Performed by: OBSTETRICS & GYNECOLOGY

## 2021-03-26 PROCEDURE — 86901 BLOOD TYPING SEROLOGIC RH(D): CPT | Performed by: OBSTETRICS & GYNECOLOGY

## 2021-03-26 PROCEDURE — 258N000003 HC RX IP 258 OP 636: Performed by: ANESTHESIOLOGY

## 2021-03-26 RX ORDER — MODIFIED LANOLIN
OINTMENT (GRAM) TOPICAL
Status: DISCONTINUED | OUTPATIENT
Start: 2021-03-26 | End: 2021-03-27 | Stop reason: HOSPADM

## 2021-03-26 RX ORDER — SODIUM CHLORIDE, SODIUM LACTATE, POTASSIUM CHLORIDE, CALCIUM CHLORIDE 600; 310; 30; 20 MG/100ML; MG/100ML; MG/100ML; MG/100ML
INJECTION, SOLUTION INTRAVENOUS CONTINUOUS
Status: DISCONTINUED | OUTPATIENT
Start: 2021-03-26 | End: 2021-03-26

## 2021-03-26 RX ORDER — NALOXONE HYDROCHLORIDE 0.4 MG/ML
0.2 INJECTION, SOLUTION INTRAMUSCULAR; INTRAVENOUS; SUBCUTANEOUS
Status: DISCONTINUED | OUTPATIENT
Start: 2021-03-26 | End: 2021-03-26

## 2021-03-26 RX ORDER — AMOXICILLIN 250 MG
1 CAPSULE ORAL 2 TIMES DAILY
Status: DISCONTINUED | OUTPATIENT
Start: 2021-03-26 | End: 2021-03-27 | Stop reason: HOSPADM

## 2021-03-26 RX ORDER — OXYTOCIN/0.9 % SODIUM CHLORIDE 30/500 ML
1-24 PLASTIC BAG, INJECTION (ML) INTRAVENOUS CONTINUOUS
Status: DISCONTINUED | OUTPATIENT
Start: 2021-03-26 | End: 2021-03-26

## 2021-03-26 RX ORDER — BISACODYL 10 MG
10 SUPPOSITORY, RECTAL RECTAL DAILY PRN
Status: DISCONTINUED | OUTPATIENT
Start: 2021-03-28 | End: 2021-03-27 | Stop reason: HOSPADM

## 2021-03-26 RX ORDER — NALBUPHINE HYDROCHLORIDE 10 MG/ML
2.5-5 INJECTION, SOLUTION INTRAMUSCULAR; INTRAVENOUS; SUBCUTANEOUS EVERY 6 HOURS PRN
Status: DISCONTINUED | OUTPATIENT
Start: 2021-03-26 | End: 2021-03-26

## 2021-03-26 RX ORDER — OXYTOCIN/0.9 % SODIUM CHLORIDE 30/500 ML
340 PLASTIC BAG, INJECTION (ML) INTRAVENOUS CONTINUOUS PRN
Status: DISCONTINUED | OUTPATIENT
Start: 2021-03-26 | End: 2021-03-27 | Stop reason: HOSPADM

## 2021-03-26 RX ORDER — TRANEXAMIC ACID 10 MG/ML
1 INJECTION, SOLUTION INTRAVENOUS EVERY 30 MIN PRN
Status: DISCONTINUED | OUTPATIENT
Start: 2021-03-26 | End: 2021-03-26

## 2021-03-26 RX ORDER — CARBOPROST TROMETHAMINE 250 UG/ML
250 INJECTION, SOLUTION INTRAMUSCULAR
Status: DISCONTINUED | OUTPATIENT
Start: 2021-03-26 | End: 2021-03-26

## 2021-03-26 RX ORDER — ONDANSETRON 4 MG/1
4 TABLET, ORALLY DISINTEGRATING ORAL EVERY 6 HOURS PRN
Status: DISCONTINUED | OUTPATIENT
Start: 2021-03-26 | End: 2021-03-26

## 2021-03-26 RX ORDER — OXYTOCIN 10 [USP'U]/ML
10 INJECTION, SOLUTION INTRAMUSCULAR; INTRAVENOUS
Status: DISCONTINUED | OUTPATIENT
Start: 2021-03-26 | End: 2021-03-26

## 2021-03-26 RX ORDER — AMOXICILLIN 250 MG
2 CAPSULE ORAL 2 TIMES DAILY
Status: DISCONTINUED | OUTPATIENT
Start: 2021-03-26 | End: 2021-03-27 | Stop reason: HOSPADM

## 2021-03-26 RX ORDER — ONDANSETRON 2 MG/ML
4 INJECTION INTRAMUSCULAR; INTRAVENOUS EVERY 6 HOURS PRN
Status: DISCONTINUED | OUTPATIENT
Start: 2021-03-26 | End: 2021-03-26

## 2021-03-26 RX ORDER — METHYLERGONOVINE MALEATE 0.2 MG/ML
200 INJECTION INTRAVENOUS
Status: DISCONTINUED | OUTPATIENT
Start: 2021-03-26 | End: 2021-03-26

## 2021-03-26 RX ORDER — OXYTOCIN/0.9 % SODIUM CHLORIDE 30/500 ML
100 PLASTIC BAG, INJECTION (ML) INTRAVENOUS CONTINUOUS
Status: DISCONTINUED | OUTPATIENT
Start: 2021-03-26 | End: 2021-03-27 | Stop reason: HOSPADM

## 2021-03-26 RX ORDER — HYDROCORTISONE 2.5 %
CREAM (GRAM) TOPICAL 3 TIMES DAILY PRN
Status: DISCONTINUED | OUTPATIENT
Start: 2021-03-26 | End: 2021-03-27 | Stop reason: HOSPADM

## 2021-03-26 RX ORDER — LIDOCAINE 40 MG/G
CREAM TOPICAL
Status: DISCONTINUED | OUTPATIENT
Start: 2021-03-26 | End: 2021-03-26

## 2021-03-26 RX ORDER — ACETAMINOPHEN 325 MG/1
650 TABLET ORAL EVERY 4 HOURS PRN
Status: DISCONTINUED | OUTPATIENT
Start: 2021-03-26 | End: 2021-03-26

## 2021-03-26 RX ORDER — TRANEXAMIC ACID 10 MG/ML
1 INJECTION, SOLUTION INTRAVENOUS EVERY 30 MIN PRN
Status: DISCONTINUED | OUTPATIENT
Start: 2021-03-26 | End: 2021-03-27 | Stop reason: HOSPADM

## 2021-03-26 RX ORDER — OXYTOCIN/0.9 % SODIUM CHLORIDE 30/500 ML
100-340 PLASTIC BAG, INJECTION (ML) INTRAVENOUS CONTINUOUS PRN
Status: COMPLETED | OUTPATIENT
Start: 2021-03-26 | End: 2021-03-26

## 2021-03-26 RX ORDER — NALOXONE HYDROCHLORIDE 0.4 MG/ML
0.4 INJECTION, SOLUTION INTRAMUSCULAR; INTRAVENOUS; SUBCUTANEOUS
Status: DISCONTINUED | OUTPATIENT
Start: 2021-03-26 | End: 2021-03-26

## 2021-03-26 RX ORDER — OXYCODONE AND ACETAMINOPHEN 5; 325 MG/1; MG/1
1 TABLET ORAL
Status: DISCONTINUED | OUTPATIENT
Start: 2021-03-26 | End: 2021-03-26

## 2021-03-26 RX ORDER — ACETAMINOPHEN 325 MG/1
650 TABLET ORAL EVERY 4 HOURS PRN
Status: DISCONTINUED | OUTPATIENT
Start: 2021-03-26 | End: 2021-03-27 | Stop reason: HOSPADM

## 2021-03-26 RX ORDER — FENTANYL CITRATE 50 UG/ML
50-100 INJECTION, SOLUTION INTRAMUSCULAR; INTRAVENOUS
Status: DISCONTINUED | OUTPATIENT
Start: 2021-03-26 | End: 2021-03-26

## 2021-03-26 RX ORDER — ROPIVACAINE HYDROCHLORIDE 2 MG/ML
10 INJECTION, SOLUTION EPIDURAL; INFILTRATION; PERINEURAL ONCE
Status: COMPLETED | OUTPATIENT
Start: 2021-03-26 | End: 2021-03-26

## 2021-03-26 RX ORDER — OXYTOCIN 10 [USP'U]/ML
10 INJECTION, SOLUTION INTRAMUSCULAR; INTRAVENOUS
Status: DISCONTINUED | OUTPATIENT
Start: 2021-03-26 | End: 2021-03-27 | Stop reason: HOSPADM

## 2021-03-26 RX ORDER — EPHEDRINE SULFATE 50 MG/ML
5 INJECTION, SOLUTION INTRAMUSCULAR; INTRAVENOUS; SUBCUTANEOUS
Status: DISCONTINUED | OUTPATIENT
Start: 2021-03-26 | End: 2021-03-26

## 2021-03-26 RX ORDER — IBUPROFEN 400 MG/1
800 TABLET, FILM COATED ORAL
Status: DISCONTINUED | OUTPATIENT
Start: 2021-03-26 | End: 2021-03-26

## 2021-03-26 RX ADMIN — ROPIVACAINE HYDROCHLORIDE 10 ML: 2 INJECTION, SOLUTION EPIDURAL; INFILTRATION at 17:01

## 2021-03-26 RX ADMIN — Medication 2 MILLI-UNITS/MIN: at 16:19

## 2021-03-26 RX ADMIN — SODIUM CHLORIDE, POTASSIUM CHLORIDE, SODIUM LACTATE AND CALCIUM CHLORIDE 1000 ML: 600; 310; 30; 20 INJECTION, SOLUTION INTRAVENOUS at 16:24

## 2021-03-26 RX ADMIN — BUPIVACAINE HYDROCHLORIDE 12 ML/HR: 5 INJECTION, SOLUTION EPIDURAL; INTRACAUDAL; PERINEURAL at 16:58

## 2021-03-26 RX ADMIN — Medication 340 ML/HR: at 18:00

## 2021-03-26 RX ADMIN — SODIUM CHLORIDE, POTASSIUM CHLORIDE, SODIUM LACTATE AND CALCIUM CHLORIDE: 600; 310; 30; 20 INJECTION, SOLUTION INTRAVENOUS at 09:02

## 2021-03-26 RX ADMIN — ACETAMINOPHEN 650 MG: 325 TABLET, FILM COATED ORAL at 20:40

## 2021-03-26 RX ADMIN — SODIUM CHLORIDE, POTASSIUM CHLORIDE, SODIUM LACTATE AND CALCIUM CHLORIDE: 600; 310; 30; 20 INJECTION, SOLUTION INTRAVENOUS at 13:53

## 2021-03-26 NOTE — ANESTHESIA PREPROCEDURE EVALUATION
Anesthesia Pre-Procedure Evaluation    Patient: Kimberlyn Shirley   MRN: [id number] : 1990        Preoperative Diagnosis: * No surgery found *   Procedure :      History reviewed. No pertinent past medical history.   History reviewed. No pertinent surgical history.   Allergies   Allergen Reactions     No Known Drug Allergies       Social History     Tobacco Use     Smoking status: Never Smoker     Smokeless tobacco: Never Used   Substance Use Topics     Alcohol use: No      Wt Readings from Last 1 Encounters:   21 77.1 kg (170 lb)        Anesthesia Evaluation            ROS/MED HX  ENT/Pulmonary:    (-) asthma   Neurologic:  - neg neurologic ROS     Cardiovascular:    (-) PIH   METS/Exercise Tolerance:     Hematologic:     (+) no anticoagulation therapy, no coagulopathy,     Musculoskeletal:       GI/Hepatic:     (+) GERD,     Renal/Genitourinary:       Endo:       Psychiatric/Substance Use:       Infectious Disease:       Malignancy:       Other:            Physical Exam    Airway        Mallampati: II   TM distance: > 3 FB   Neck ROM: full     Respiratory Devices and Support         Dental  no notable dental history         Cardiovascular   cardiovascular exam normal          Pulmonary   pulmonary exam normal                OUTSIDE LABS:  CBC:   Lab Results   Component Value Date    HGB 11.3 (L) 2021     BMP: No results found for: NA, POTASSIUM, CHLORIDE, CO2, BUN, CR, GLC  COAGS: No results found for: PTT, INR, FIBR  POC: No results found for: BGM, HCG, HCGS  HEPATIC: No results found for: ALBUMIN, PROTTOTAL, ALT, AST, GGT, ALKPHOS, BILITOTAL, BILIDIRECT, NANCY  OTHER: No results found for: PH, LACT, A1C, TAY, PHOS, MAG, LIPASE, AMYLASE, TSH, T4, T3, CRP, SED    Anesthesia Plan    ASA Status:  2      Anesthesia Type: Epidural.              Consents    Anesthesia Plan(s) and associated risks, benefits, and realistic alternatives discussed. Questions answered and patient/representative(s)  expressed understanding.     - Discussed with:  Patient         Postoperative Care            Comments:    Orders to manage the epidural infusion have been entered, and through coordination with the nurse, we will continute to manage and monitor the patient's labor epidural.  We will continuously be available to adjust as needed thruout the entire L&D process.             Allie Hernadez

## 2021-03-26 NOTE — ANESTHESIA PROCEDURE NOTES
Epidural catheter Procedure Note  Pre-Procedure   Staff -        Anesthesiologist:  Allie Hernadez       Performed By: anesthesiologist       Location: OB       Pre-Anesthestic Checklist: patient identified, IV checked, site marked, risks and benefits discussed, informed consent, monitors and equipment checked, pre-op evaluation, at physician/surgeon's request and post-op pain management  Timeout:       Correct Patient: Yes        Correct Procedure: Yes        Correct Site: Yes        Correct Position: Yes   Procedure Documentation  Procedure: epidural catheter       Patient Position: sitting       Patient Prep/Sterile Barriers: sterile gloves, patient draped       Skin prep: Betadine       Local skin infiltrated with 3 mL of 1% lidocaine.        Insertion Site: L3-4. (midline approach).       Technique: LORT saline        ANDREA at 5 cm.       Needle Type: "[employer]"y needle       Needle Gauge: 18.        Needle Length (Inches): 3.5         Catheter threaded easily.         3 cm epidural space.         Threaded 8 cm at skin.        # of attempts: 1 and  # of redirects:     Assessment/Narrative         Paresthesias: No.     Test dose of mL lidocaine 1.5% w/ 1:200,000 epinephrine at.         Test dose negative, 3 minutes after injection, for signs of intravascular, subdural, or intrathecal injection.       Insertion/Infusion Method: LORT saline       Aspiration negative for Heme or CSF via Epidural Catheter.

## 2021-03-26 NOTE — PLAN OF CARE
Assumed cares of the patient at 0715. Dr. Baptiste arrives at the bedside at 0855 and breaks the bag of water, clear fluid noted.  The patient is planning on having an epidural when she goes into active labor.

## 2021-03-26 NOTE — PROVIDER NOTIFICATION
"   03/26/21 0526   Provider Notification   Provider Name/Title Dr Santiago   Method of Notification Phone   Request Evaluate - Remote   Notification Reason Patient Arrived;SVE;Status Update     Update that patient has arrived. Patient reporting that she had a \"gush\" at 0300 of blood and had some cramping. Patient wearing a chi pad on admission, small amount of blood noted on pad. Had patient sit on a new pad in bed for 20 minutes. No blood on pad. Karen q2-4 minutes, starting to get more uncomfortable but still stating they are just \"uncomfortable\" not painful. Palpating moderate. FHR moderate variability with accelerations present. Patient was in the MAC earlier in the week for evaluation of deceleration on doppler in clinic. Did have a dip in heart rate x1 here today, it came off an accel and lined up with contraction. Dipped from 130s to 110 for about a minute. Moderate variability and accelerations present afterward.     Telephone orders received to admit patient. Continuous monitoring.  "

## 2021-03-26 NOTE — PROGRESS NOTES
OB Progress Note  3/26/2021  4:52 PM    S:  Patient now becoming very uncomfortable with contractions. Pitocin augmentation has just been initiated.        O:  /73   Pulse 80   Temp 97.5  F (36.4  C) (Temporal)   Resp 16   EFM: baseline 135, accelerations are present, no decelerations, moderate variability; Category I  [place]:  Ctx q4-10 min  SVE:  6/70%/-1  Membranes: ruptured as of 855 this morning, clear fluid    Pitocin at 2 mU/min    A/P:  30 year old  @39w4d admitted with vaginal bleeding and cramping, found to be in labor.  GBS negative.  Covid RNA detected (known previous infection , suspected to not be contagious. Entering active labor. Satisfactory fetal status.      1.  Epidural now  2.  Covid precautions and isolation  3.  Anticipate   4.  Plan of care discussed with the patient and her , who expressed understanding and agreement.      Edgard Roberson MD

## 2021-03-26 NOTE — H&P
OB Brief Admit H&P    No significant change in general health status based on examination of the patient, review of Nursing Admission Database and prenatal record.    Pt is a 30 year old  @ 39w4d who presented to L&D with vaginal bleeding. Reports passing bloody mucous after midnight, followed by a small amount of bright red bleeding. This bleeding had reduced but not stopped when she called me at 3:30 am. Reports mild cramping but no painful contractions. Denies LOF. Endorses active fetal movements.    Patient's prenatal course has been complicated by:  1. +Covid during pregnancy 12/10/2020 (mild symptoms)  2. Elevated 1 hour GTT, normal 3 hour.  3. GERD    Prenatal Labs:    Blood type A+  Rubella immune  [id number] (3 hour wnl)  GBS negative 3/1/2021    EFW: 7.5 lbs    /72   Temp 97.8  F (36.6  C) (Temporal)   Resp 16   EFM:  135, moderate variability, ++accels, one variable versus late decel  [place]: Q2-4 min  SVE: 3//70%/-3 per nursing  Membranes:  Intact; bloody mucous    Assessment:  30 year old  @ 39w4d admitted for early labor, fetal deceleration. GBS negative.    Plan:  1. Labor  - Admit to labor and delivery   - Plan AROM/pitocin as needed and tolerated to achieve and maintain active labor.  - Ok for epidural  - Anticipate .  2. ID  - GBS negative  - Covid RNA detected, but know positive on 12/10/2020 so reasonable to presume not contagious.  - S/p Tdap and influenza vaccinations this pregnancy.      Dina Santiago MD  3/26/2021  7:41 AM

## 2021-03-26 NOTE — PLAN OF CARE
" 39-4 week patient admitted to [facility] ambulatory per services of Dr Santiago for evaluation of bleeding.  Pt states she woke up from cramping and had a \"gush of blood.\" Now she is just spotting. Denies intercourse. Is feeling like she is cramping.  Discussed plan of care including EFM with NST, routine VS, and SVE.  Pt agreeable.  EFM applied and admission assessment completed.      "

## 2021-03-27 VITALS
OXYGEN SATURATION: 85 % | RESPIRATION RATE: 16 BRPM | HEART RATE: 56 BPM | SYSTOLIC BLOOD PRESSURE: 102 MMHG | TEMPERATURE: 97.7 F | DIASTOLIC BLOOD PRESSURE: 60 MMHG

## 2021-03-27 LAB — HGB BLD-MCNC: 11.6 G/DL (ref 11.7–15.7)

## 2021-03-27 PROCEDURE — 250N000011 HC RX IP 250 OP 636: Performed by: OBSTETRICS & GYNECOLOGY

## 2021-03-27 PROCEDURE — 85018 HEMOGLOBIN: CPT | Performed by: OBSTETRICS & GYNECOLOGY

## 2021-03-27 PROCEDURE — 36415 COLL VENOUS BLD VENIPUNCTURE: CPT | Performed by: OBSTETRICS & GYNECOLOGY

## 2021-03-27 PROCEDURE — 250N000013 HC RX MED GY IP 250 OP 250 PS 637: Performed by: OBSTETRICS & GYNECOLOGY

## 2021-03-27 RX ADMIN — ACETAMINOPHEN 650 MG: 325 TABLET, FILM COATED ORAL at 04:43

## 2021-03-27 RX ADMIN — SENNOSIDES AND DOCUSATE SODIUM 1 TABLET: 8.6; 5 TABLET ORAL at 08:02

## 2021-03-27 RX ADMIN — ENOXAPARIN SODIUM 40 MG: 40 INJECTION SUBCUTANEOUS at 17:09

## 2021-03-27 RX ADMIN — ACETAMINOPHEN 650 MG: 325 TABLET, FILM COATED ORAL at 14:01

## 2021-03-27 RX ADMIN — ACETAMINOPHEN 650 MG: 325 TABLET, FILM COATED ORAL at 00:57

## 2021-03-27 RX ADMIN — ACETAMINOPHEN 650 MG: 325 TABLET, FILM COATED ORAL at 18:23

## 2021-03-27 RX ADMIN — ACETAMINOPHEN 650 MG: 325 TABLET, FILM COATED ORAL at 09:13

## 2021-03-27 NOTE — PROGRESS NOTES
OB Post-partum Note  PPD# 1    S:  Patient doing well.  Pain controlled.  Voiding.  Bleeding is normal.  Breast feeding. Couple request discharge today.     O:  /68   Pulse 56   Temp 97.6  F (36.4  C) (Oral)   Resp 16   SpO2 (!) 85%   Breastfeeding Unknown   Gen- A&O, NAD  Abd- Non-tender, fundus non tender and firm   Ext- non-tender, no calf pain, PCD's in place and functioning well    Hemoglobin   Date Value Ref Range Status   2021 11.3 (L) 11.7 - 15.7 g/dL Final     A positive  Rubella Immune  Positive Covid 19 NP swab on admission yesterday.  Previous mildly symptomatic Covid 19 infection in 2020      A/P: 30 year old  PPD# 1 s/p .  Doing well post partum with no apparent complications.  Positive Covid 19 test on admission, but previous documented   Covid 19 infection.  Possible residual Covid infection, new infection, different strain.  The patient has been managed and treated (isolation precautions, PPE, etc) as an active Covid 19 infection.  At risk for post partum thrombosis and associated complications, although the patient has not had severe Covid disease.  Patient requests discharge today, PPD #1.     1.  Routine post-partum cares.   2.  Lovenox 40 mg sub Q this morning  (at least 12 hours after epidural placement and at least 4 hours after catheter removal)  3.  Analgesia with Tylenol for now  4.  Discharge later today per patient request.   5.  The plan of care was discussed with the patient and her .  She expressed understanding and agreement  6.  Discussed the importance of ambulation and avoidance of stasis of the lower extremities.  Signs and symptoms of DVT were discussed. Indications to call or return were discussed.   7.  RTC for the routine 6 week post partum visit.    Edgard Roberson MD  3/27/2021  9:02 AM

## 2021-03-27 NOTE — PLAN OF CARE
Vital signs stable. Fundus firm,midline at 1 below umbilicus with scant flow.Covid positive, loss of smell, denies other symptoms. SCD  pump on, will be starting Lovenox this morning per MD note. Voiding without difficulty. Able to ambulate in room free of dizziness. Taking tylenol for pain management. Working on breastfeeding infant every 2-3 hours. Encouraged to call with questions/concerns. Will continue to monitor.

## 2021-03-27 NOTE — DISCHARGE INSTRUCTIONS

## 2021-03-27 NOTE — PLAN OF CARE
Asymptomatic COVID (+) patient. Vital signs stable, fundus firm, scant rubra flow. Patient is up ad meeta, SCD's on while in bd. voiding adequately, pain well controlled with tylenol. Ibuprofen discontinued due increased risk of bleeding. Encouraged to continue to ambulate as able and void frequently. Patient is bonding well with infant. Infant breast fed with a good latch. Transferred to [facility] 416 via wheelchair, with infant in arms. Accompanied by spouse. Report given to Salome PUENTE RN

## 2021-03-27 NOTE — PLAN OF CARE
Vitals signs stable. Fundus firm. Pain well controlled with Tylenol. Working on breastfeeding every 2-3 hours. Patient has been wearing PCD's to help prevent blood clots. Lovenox dose given this evening per MD order. Planning on discharging home today. Discharge instructions and follow up discussed. Questions and concerns addressed.

## 2021-03-27 NOTE — PLAN OF CARE
Vital signs are WNL and patient rates pain as 1/10. She is only taking Tylenol for pain due to a positive Covid test. She is keeping PCD's on when in bed and will get Lovenox this evening at 1700. Fundus is firm and midline.  Discharge order is in. Spouse present and very supportive.

## 2021-03-27 NOTE — PLAN OF CARE
Patient delivered vaginally without complication.  Patient tested positive for COVID and COVID precautions were implemented and the patient will be managed after delivery for high risk for DVT due to positive COVID.

## 2021-03-27 NOTE — PLAN OF CARE
Dr. Baptiste is updated with the patient's uterine contractions that have spaced out to every 10 minutes at 1600 and he orders to start Pitocin to augment labor.  Pitocin is started at 1619. The patient started having a lot more discomfort with labor at 1624 and asks for an epidural.  Dr. Roberson and Dr. Hernadez arrive at the bedside at 1650.  Dr. Hernadez places the labor epidural.  Patient is complete at 1735 and starts pushing at 1740.  Patient delivers a baby boy at 1755.  Apgars 8 and 9.  Renee Karimi RN assumes cares at 1715.

## 2021-03-27 NOTE — PLAN OF CARE
Patient arrived to room 416 at 2105 via wheel chair with infant in arms. Report received from Bia Karimi RN.ID bands verified with RN and mom. Patient and spouse oriented to room. Safety education completed. Call light within reach. Encouraged to call with questions/concerns.

## 2021-03-27 NOTE — L&D DELIVERY NOTE
Delivery Date:  2021      ANTEPARTUM DIAGNOSES:  Intrauterine pregnancy, 39 weeks 4 days gestation, group B strep negative, history of COVID-19 infection in 2020, positive COVID-19 nasopharyngeal swab on admission.      POSTPARTUM DIAGNOSES:  Status post amniotomy and Pitocin-augmented, normal spontaneous vaginal delivery, infant male, weight pending, Apgars 8 and 9.  Right vaginal sidewall laceration and small hematoma, repaired.  Spontaneous placental passage, intact.  COVID-19 isolation and precautions during labor and delivery.      PATIENT IDENTIFICATION:  Kimberlyn Shirley is a 30-year-old  female,  2, para 1 at 39 weeks 4 days' gestation who was evaluated in the maternal assessment area at [facility] by 0756 hours on 2021 for vaginal bleeding.  The patient had some bloody mucus  shortly after midnight, followed by a small amount of bright red bleeding.  The bleeding had reduced, but had not stopped when the patient notified our office.  The patient had mild cramping, but no painful contractions.  She denied loss of fluid.  The patient reports that the baby was active.  The patient's prenatal course was complicated by the COVID-19 infection during pregnancy.  The patient tested positive for COVID-19 on 12/10/2020.  She had mild symptoms.  The patient also had an elevated 1-hour glucose challenge test, but a normal 3-hour GTT.  She also experienced gastroesophageal reflux disease during pregnancy.  Her blood type is A positive, rubella titer showed immunity, 1-hour glucose was 145, but a 3-hour GTT was normal.  Group B strep was negative at term.  The estimated fetal weight was 7.5 pounds on admission.      HOSPITAL COURSE:  The patient was seen and evaluated in the maternal assessment area by 0500 hours on 2021.  Her cervix on admission was 3 cm, 70%, vertex -3.  Fetal heart tones were normal with a normal baseline, moderate variability and accelerations were  present.  There were no decelerations.  The fetal heart rate tracing was category 1.  The patient was admitted to Labor and Delivery, felt to be in early labor.  Her group B strep test was negative.  COVID-19 test subsequently returned positive and it was unclear whether this was a second infection or possibly residual from the patient's previous infection.  Nonetheless, due to of an abundance of caution, the patient was treated with strict isolation precautions as a positive COVID-19.  The patient was observed and she continued to have contractions.  She was examined by my partner, Dr. Baptiste by 0855 hours on 03/26/2021.  The cervix was 4 cm, 70%, vertex -2.  Fetal heart tones remained category 1.  It should be mentioned that initially on evaluation in the maternal assessment area, there was 1 fetal deceleration that was felt to be either variable intrathoracic or possibly a late deceleration.  This resolved and the tracing was thereafter category 1.  Amniotomy was performed at 0855 hours and the fluid was clear.  The patient then continued to have contractions, but on examination at 1400 hours, the cervix was 4-5 cm dilated.  The patient initially preferred to have no Pitocin augmentation.  However, when her cervix was later on changed, she agreed to Pitocin augmentation of labor.  By 1700 hours, intravenous Pitocin was initiated at 2 milliunits per minute.  The patient became very uncomfortable immediately after the initiation of Pitocin.  Her contractions increased in frequency and intensity.  An epidural anesthetic was administered shortly after 1700 hours on 03/26/2021.  The patient had been reexamined just prior to the epidural and the cervix was 6 cm, 100%, and the vertex was at 0 station.  The patient received only partial analgesia from the epidural.  Her labor was progressing rapidly, and by 1735 hours the cervix was completely dilated and completely effaced.  Preparations were made for the  second stage  of labor.      SECOND STAGE OF LABOR:  Maternal expulsive efforts were begun immediately after the patient became completely dilated.  I prepared to myself for delivery.  Isolation precautions were followed strictly with a N95 mask and a total face shield for the delivering physician.  The nurse in attendance did wear a respirator/papper for personal reasons.  The second Labor and Delivery nurse that entered also had an N95 and a total shield.  The patient pushed effectively and brought the vertex down in the pelvis.  After approximately 15 minutes of pushing, the vertex came to a full crown.  No episiotomy was necessary.  At 1755 hours on 2021, the patient delivered a viable infant male with weight pending and Apgars of 8 and 9 at 1 and 5 minutes.  The baby was in a left occiput anterior position.  A single nuchal cord was loose and easily reduced.  The baby was suctioned on the perineum and the baby was delivered without shoulder dystocia.  The baby was brought up to the patient's abdomen.  The baby required no resuscitation.  Delayed cord clamping was performed.  The baby required no resuscitation, as evidenced by the excellent Apgar scores.      THIRD STAGE OF LABOR:  After a 5-minute third stage of labor, the placenta delivered spontaneously intact with a 3-vessel cord.  Examination of the perineum found no lacerations of the perineum.  There was a small hematoma forming on the right vaginal sidewall with a 2 cm superficial laceration over the small hematoma.  This was carefully examined and was not expanding.  A single figure-of-X suture of 3-0 Vicryl was placed over this 2 cm laceration.  Manual pressure was held over this area and there was no expanding hematoma.  The remainder of the perineum was completely intact.  The quantitative blood loss for the delivery was 120 mL.  Uterine tone was excellent as intravenous Pitocin was infused.  The mother and baby remained in the birthing room in excellent  condition, having tolerated the procedure well.  The patient, by virtue of her positive COVID nasopharyngeal swab, is at risk for postpartum thrombosis.  For this reason, she will be treated with Lovenox 40 mg subcutaneous every 24 hours while in the hospital.  The first dose of Lovenox will be at least 12 hours after placement of the epidural and at least 4 hours after removal of the epidural catheter.  The patient and the baby remained in the birthing room in excellent condition.      DELIVERY SUMMARY:  Pregnancy 39 weeks 4 days gestation.  Spontaneous active labor with inadequate uterine contractions.  Status post amniotomy and Pitocin-augmented labor.  Group B strep negative.  COVID-19 nasopharyngeal swab positive.  Previous COVID-19 infection 12/10/2020.  The positive COVID test was felt to be potentially residual from previous infection and unlikely a second infection.  Despite the fact that the patient was not felt to be acutely contagious, strict isolation precautions were followed during the Labor and Delivery.  Status post normal spontaneous vaginal delivery, infant male, weight pending, Apgars 8 and 9.  Right vaginal small laceration, repaired.  Spontaneous placental passage intact, normal quantitative blood loss of 120 mL.  Satisfactory maternal and fetal status postpartum.  Given the positive COVID-19 test and the patient's risk for postpartum thrombosis, she will be treated with Lovenox 40 mg subcutaneous starting tomorrow morning at 0900 hours.  This will be at least 12 hours from the placement of the epidural and at least 4 hours from removal of the epidural catheter.  The plan of care for anticoagulation postpartum will be discussed.         EARNEST CUBA MD             D: 2021   T: 2021   MT:       Name:     WALTER KOENIG   MRN:      [id number]        Account:        [id number]   :      1990        Delivery Date:  2021               Document: [id number]

## 2021-06-05 ENCOUNTER — HEALTH MAINTENANCE LETTER (OUTPATIENT)
Age: 31
End: 2021-06-05

## 2021-09-25 ENCOUNTER — HEALTH MAINTENANCE LETTER (OUTPATIENT)
Age: 31
End: 2021-09-25

## 2022-07-02 ENCOUNTER — HEALTH MAINTENANCE LETTER (OUTPATIENT)
Age: 32
End: 2022-07-02

## 2023-01-07 ENCOUNTER — HEALTH MAINTENANCE LETTER (OUTPATIENT)
Age: 33
End: 2023-01-07

## 2024-06-14 NOTE — ANESTHESIA POSTPROCEDURE EVALUATION
Nicotine Transdermal System   Habitrol, Nicoderm C-Q    Uses  For quitting smoking.    Instructions  DO NOT take this medicine by mouth.    Avoid placing the patch near the breast.    Remove the patch after 24 hours.    Keep the medicine at room temperature. Avoid heat and direct light.    This patch should not be cut.    Wash your hands before and after handling this medicine.    Remove old patch before applying new one. Change the location of the new patch.    If you have vivid dreams or trouble sleeping, you may remove the patch before going to sleep.    Ask your doctor or pharmacist about locations on your body where this patch can be used.    Remove the plastic liner that protects the sticky side of the patch before applying to the skin.    Be sure the area of skin is clean and dry before putting on a new patch.    Apply the patch to a clean, dry, hairless area.    Press the patch firmly for a few seconds to make sure it stays in place.    After removing the patch, fold it together and discard it out of reach of children and pets.    Please ask your doctor or pharmacist how you can safely dispose of used patches.    If the skin under the patch becomes irritated, remove the patch. Do not apply a new patch to the area until the skin feels better.    To avoid irritating your skin, use a different location for a new patch.    Apply the patch only to normal looking skin. Avoid areas of the skin that are red, have scrapes, or damaged.    If the patch falls off, apply a new a patch on a different location of the body.    Please tell your doctor and pharmacist about all the medicines you take. Include both prescription and over-the-counter medicines. Also tell them about any vitamins, herbal medicines, or anything else you take for your health.    If you need to stop this medicine, your doctor may wish to gradually reduce the dosage before stopping.    Do not use more than 1 patch at any one time.    Cautions  Tell  Patient: Kimberlyn Shirley    * No procedures listed *    Diagnosis:* No pre-op diagnosis entered *  Diagnosis Additional Information: No value filed.    Anesthesia Type:  No value filed.    Note:  Disposition: Inpatient   Postop Pain Control: Uneventful            Sign Out: Well controlled pain   PONV: No   Neuro/Psych: Uneventful            Sign Out: Acceptable/Baseline neuro status   Airway/Respiratory: Uneventful            Sign Out: Acceptable/Baseline resp. status   CV/Hemodynamics: Uneventful            Sign Out: Acceptable CV status   Other NRE: NONE   DID A NON-ROUTINE EVENT OCCUR? No    Event details/Postop Comments:  Epidural has worn off without complications         Last vitals:  Vitals:    03/27/21 0057 03/27/21 0443 03/27/21 0752   BP: 100/52 108/66 111/68   Pulse: 54 53 56   Resp: 16 16 16   Temp: 36.5  C (97.7  F) 36.7  C (98  F) 36.4  C (97.6  F)   SpO2:          Last vitals prior to Anesthesia Care Transfer:      Electronically Signed By: Olaf Castellon MD  March 27, 2021  12:31 PM   your doctor and pharmacist if you ever had an allergic reaction to a medicine. Symptoms of an allergic reaction can include trouble breathing, skin rash, itching, swelling, or severe dizziness.    Do not use the medication any more than instructed.    Avoid smoking while on this medicine. Smoking may increase your risk for stroke, heart attack, blood clots, high blood pressure, and other diseases of the heart and blood vessels.    Tell the doctor or pharmacist if you are pregnant, planning to be pregnant, or breastfeeding.    Ask your pharmacist if this medicine can interact with any of your other medicines. Be sure to tell them about all the medicines you take.    Please tell all your doctors and dentists that you are on this medicine before they provide care.    Side Effects  The following is a list of some common side effects from this medicine. Please speak with your doctor about what you should do if you experience these or other side effects.    skin irritation where medicine is applied    If you have any of the following side effects, you may be getting too much medicine. Please contact your doctor to let them know about these side effects.    diarrhea  dizziness  nausea  rapid heartbeat  vomiting    A few people may have an allergic reaction to this medicine. Symptoms can include difficulty breathing, skin rash, itching, swelling, or severe dizziness. If you notice any of these symptoms, seek medical help quickly.    Extra  Please speak with your doctor, nurse, or pharmacist if you have any questions about this medicine.      [email or web address]  IMPORTANT NOTE: This document tells you briefly how to take your medicine, but it does not tell you all there is to know about it. Your doctor or pharmacist may give you other documents about your medicine. Please talk to them if you have any questions. Always follow their advice. There is a more complete description of this medicine  "available in English. Scan this code on your smartphone or tablet or use the web address below. You can also ask your pharmacist for a printout. If you have any questions, please ask your pharmacist.   2021 [employer].      1407-9013 [employer]. All rights reserved. This information is not intended as a substitute for professional medical care. Always follow your healthcare professional's instructions.    Patient Education   Preventive Care Advice   This is general advice we often give to help people stay healthy. Your care team may have specific advice just for you. Please talk to your care team about your own preventive care needs.  Lifestyle  Exercise at least 150 minutes each week (30 minutes a day, 5 days a week).  Do muscle strengthening activities 2 days a week. These help control your weight and prevent disease.  No smoking.  Wear sunscreen to prevent skin cancer.  Have your home tested for radon every 2 to 5 years. Radon is a colorless, odorless gas that can harm your lungs. To learn more, go to [email or web address].us and search for \"Radon in Homes.\"  Keep guns unloaded and locked up in a safe place like a safe or gun vault, or, use a gun lock and hide the keys. Always lock away bullets separately. To learn more, visit [employer].[place].gov and search for \"safe gun storage.\"  Nutrition  Eat 5 or more servings of fruits and vegetables each day.  Try wheat bread, brown rice and whole grain pasta (instead of white bread, rice, and pasta).  Get enough calcium and vitamin D. Check the label on foods and aim for 100% of the RDA (recommended daily allowance).  Regular exams  Have a dental exam and cleaning every 6 months.  See your health care team every year to talk about:  Any changes in your health.  Any medicines your care team has prescribed.  Preventive care, family planning, and ways to prevent chronic diseases.  Shots (vaccines)   HPV shots (up to age 26), if you've never had them " before.  Hepatitis B shots (up to age 59), if you've never had them before.  COVID-19 shot: Get this shot when it's due.  Flu shot: Get a flu shot every year.  Tetanus shot: Get a tetanus shot every 10 years.  Pneumococcal, hepatitis A, and RSV shots: Ask your care team if you need these based on your risk.  Shingles shot (for age 50 and up).  General health tests  Diabetes screening:  Starting at age 35, Get screened for diabetes at least every 3 years.  If you are younger than age 35, ask your care team if you should be screened for diabetes.  Cholesterol test: At age 39, start having a cholesterol test every 5 years, or more often if advised.  Bone density scan (DEXA): At age 50, ask your care team if you should have this scan for osteoporosis (brittle bones).  Hepatitis C: Get tested at least once in your life.  Abdominal aortic aneurysm screening: Talk to your doctor about having this screening if you:  Have ever smoked; and  Are biologically male; and  Are between the ages of 65 and 75.  STIs (sexually transmitted infections)  Before age 24: Ask your care team if you should be screened for STIs.  After age 24: Get screened for STIs if you're at risk. You are at risk for STIs (including HIV) if:  You are sexually active with more than one person.  You don't use condoms every time.  You or a partner was diagnosed with a sexually transmitted infection.  If you are at risk for HIV, ask about PrEP medicine to prevent HIV.  Get tested for HIV at least once in your life, whether you are at risk for HIV or not.  Cancer screening tests  Cervical cancer screening: If you have a cervix, begin getting regular cervical cancer screening tests at age 21. Most people who have regular screenings with normal results can stop after age 65. Talk about this with your provider.  Breast cancer scan (mammogram): If you've ever had breasts, begin having regular mammograms starting at age 40. This is a scan to check for breast  cancer.  Colon cancer screening: It is important to start screening for colon cancer at age 45.  Have a colonoscopy test every 10 years (or more often if you're at risk) Or, ask your provider about stool tests like a FIT test every year or Cologuard test every 3 years.  To learn more about your testing options, visit: www.[email or web address]/593681.pdf.  For help making a decision, visit: samuel/[email or web address].  Prostate cancer screening test: If you have a prostate and are age 55 to 69, ask your provider if you would benefit from a yearly prostate cancer screening test.  Lung cancer screening: If you are a current or former smoker age 50 to 80, ask your care team if ongoing lung cancer screenings are right for you.  For informational purposes only. Not to replace the advice of your health care provider. Copyright   2023 [employer]. All rights reserved. Clinically reviewed by the [facility]. [employer] [id number] - REV 04/24.     Lung Cancer Screening   Frequently Asked Questions  If you are at high-risk for lung cancer, getting screened with low-dose computed tomography (LDCT) every year can help save your life. This handout offers answers to some of the most common questions about lung cancer screening. If you have other questions, please call [phone] ([phone]).     What is it?  Lung cancer screening uses special X-ray technology to create an image of your lung tissue. The exam is quick and easy and takes less than 10 seconds. We don t give you any medicine or use any needles. You can eat before and after the exam. You don t need to change your clothes as long as the clothing on your chest doesn t contain metal. But, you do need to be able to hold your breath for at least 6 seconds during the exam.    What is the goal of lung cancer screening?  The goal of lung cancer screening is to save lives. Many times, lung cancer is not found until a person starts having physical  symptoms. Lung cancer screening can help detect lung cancer in the earliest stages when it may be easier to treat.    Who should be screened for lung cancer?  We suggest lung cancer screening for anyone who is at high-risk for lung cancer. You are in the high-risk group if you:      are between the ages of 55 and 79, and    have smoked at least 1 pack of cigarettes a day for 20 or more years, and    still smoke or have quit within the past 15 years.    However, if you have a new cough or shortness of breath, you should talk to your doctor before being screened.    Why does it matter if I have symptoms?  Certain symptoms can be a sign that you have a condition in your lungs that should be checked and treated by your doctor. These symptoms include fever, chest pain, a new or changing cough, shortness of breath that you have never felt before, coughing up blood or unexplained weight loss. Having any of these symptoms can greatly affect the results of lung cancer screening.       Should all smokers get an LDCT lung cancer screening exam?  It depends. Lung cancer screening is for a very specific group of men and women who have a history of heavy smoking over a long period of time (see  Who should be screened for lung cancer  above).  I am in the high-risk group, but have been diagnosed with cancer in the past. Is LDCT lung cancer screening right for me?  In some cases, you should not have LDCT lung screening, such as when your doctor is already following your cancer with CT scan studies. Your doctor will help you decide if LDCT lung screening is right for you.  Do I need to have a screening exam every year?  Yes. If you are in the high-risk group described earlier, you should get an LDCT lung cancer screening exam every year until you are 79, or are no longer willing or able to undergo screening and possible procedures to diagnose and treat lung cancer.  How effective is LDCT at preventing death from lung  cancer?  Studies have shown that LDCT lung cancer screening can lower the risk of death from lung cancer by 20 percent in people who are at high-risk.  What are the risks?  There are some risks and limitations of LDCT lung cancer screening. We want to make sure you understand the risks and benefits, so please let us know if you have any questions. Your doctor may want to talk with you more about these risks.    Radiation exposure: As with any exam that uses radiation, there is a very small increased risk of cancer. The amount of radiation in LDCT is small--about the same amount a person would get from a mammogram. Your doctor orders the exam when he or she feels the potential benefits outweigh the risks.    False negatives: No test is perfect, including LDCT. It is possible that you may have a medical condition, including lung cancer, that is not found during your exam. This is called a false negative result.    False positives and more testing: LDCT very often finds something in the lung that could be cancer, but in fact is not. This is called a false positive result. False positive tests often cause anxiety. To make sure these findings are not cancer, you may need to have more tests. These tests will be done only if you give us permission. Sometimes patients need a treatment that can have side effects, such as a biopsy. For more information on false positives, see  What can I expect from the results?     Findings not related to lung cancer: Your LDCT exam also takes pictures of areas of your body next to your lungs. In a very small number of cases, the CT scan will show an abnormal finding in one of these areas, such as your kidneys, adrenal glands, liver or thyroid. This finding may not be serious, but you may need more tests. Your doctor can help you decide what other tests you may need, if any.  What can I expect from the results?  About 1 out of 4 LDCT exams will find something that may need more tests. Most  of the time, these findings are lung nodules. Lung nodules are very small collections of tissue in the lung. These nodules are very common, and the vast majority--more than 97 percent--are not cancer (benign). Most are normal lymph nodes or small areas of scarring from past infections.  But, if a small lung nodule is found to be cancer, the cancer can be cured more than 90 percent of the time. To know if the nodule is cancer, we may need to get more images before your next yearly screening exam. If the nodule has suspicious features (for example, it is large, has an odd shape or grows over time), we will refer you to a specialist for further testing.  Will my doctor also get the results?  Yes. Your doctor will get a copy of your results.  Is it okay to keep smoking now that there s a cancer screening exam?  No. Tobacco is one of the strongest cancer-causing agents. It causes not only lung cancer, but other cancers and cardiovascular (heart) diseases as well. The damage caused by smoking builds over time. This means that the longer you smoke, the higher your risk of disease. While it is never too late to quit, the sooner you quit, the better.  Where can I find help to quit smoking?  The best way to prevent lung cancer is to stop smoking. If you have already quit smoking, congratulations and keep it up! For help on quitting smoking, please call [employer] at 3-501-QUITNOW (1-623.713.6171) or the American [employer] Society at 1-753.447.6892 to find local resources near you.  One-on-one health coaching:  If you d prefer to work individually with a health care provider on tobacco cessation, we offer:      Medication Therapy Management:  Our specially trained pharmacists work closely with you and your doctor to help you quit smoking.  Call [phone] or [phone] (toll free).